# Patient Record
Sex: FEMALE | Race: WHITE | NOT HISPANIC OR LATINO | URBAN - METROPOLITAN AREA
[De-identification: names, ages, dates, MRNs, and addresses within clinical notes are randomized per-mention and may not be internally consistent; named-entity substitution may affect disease eponyms.]

---

## 2019-03-14 ENCOUNTER — EXTERNAL RECORD (OUTPATIENT)
Dept: OTHER | Age: 59
End: 2019-03-14

## 2020-01-19 ENCOUNTER — WALK IN (OUTPATIENT)
Dept: URGENT CARE | Age: 60
End: 2020-01-19

## 2020-01-19 VITALS
WEIGHT: 136 LBS | HEIGHT: 67 IN | RESPIRATION RATE: 16 BRPM | TEMPERATURE: 97.7 F | SYSTOLIC BLOOD PRESSURE: 100 MMHG | OXYGEN SATURATION: 99 % | BODY MASS INDEX: 21.35 KG/M2 | HEART RATE: 88 BPM | DIASTOLIC BLOOD PRESSURE: 68 MMHG

## 2020-01-19 DIAGNOSIS — J06.9 ACUTE URI: Primary | ICD-10-CM

## 2020-01-19 PROCEDURE — 99203 OFFICE O/P NEW LOW 30 MIN: CPT | Performed by: NURSE PRACTITIONER

## 2020-01-19 RX ORDER — CETIRIZINE HYDROCHLORIDE 10 MG/1
10 TABLET ORAL DAILY
COMMUNITY

## 2020-01-19 RX ORDER — ESTROGEN,CON/M-PROGEST ACET 0.3-1.5MG
1 TABLET ORAL DAILY
Refills: 3 | COMMUNITY
Start: 2020-01-02

## 2020-01-19 ASSESSMENT — ENCOUNTER SYMPTOMS
FATIGUE: 1
SHORTNESS OF BREATH: 0
WHEEZING: 0
RHINORRHEA: 1
DIAPHORESIS: 0
SORE THROAT: 0
CHILLS: 0
FEVER: 0
COUGH: 1

## 2020-11-09 ENCOUNTER — HOSPITAL ENCOUNTER (OUTPATIENT)
Age: 60
Discharge: HOME | End: 2020-11-09
Payer: COMMERCIAL

## 2020-11-09 VITALS
TEMPERATURE: 99 F | SYSTOLIC BLOOD PRESSURE: 140 MMHG | HEART RATE: 78 BPM | OXYGEN SATURATION: 98 % | RESPIRATION RATE: 14 BRPM | DIASTOLIC BLOOD PRESSURE: 69 MMHG

## 2020-11-09 DIAGNOSIS — Z20.822 ENCOUNTER FOR LABORATORY TESTING FOR COVID-19 VIRUS: ICD-10-CM

## 2020-11-09 DIAGNOSIS — J34.89 NASAL CONGESTION WITH RHINORRHEA: Primary | ICD-10-CM

## 2020-11-09 DIAGNOSIS — R09.81 NASAL CONGESTION WITH RHINORRHEA: Primary | ICD-10-CM

## 2020-11-09 PROBLEM — I48.3 TYPICAL ATRIAL FLUTTER (HCC): Status: ACTIVE | Noted: 2019-10-10

## 2020-11-09 PROBLEM — R92.2 BREAST DENSITY: Status: ACTIVE | Noted: 2017-07-24

## 2020-11-09 PROBLEM — J30.9 RHINITIS, ALLERGIC: Status: ACTIVE | Noted: 2018-10-30

## 2020-11-09 PROCEDURE — 99202 OFFICE O/P NEW SF 15 MIN: CPT | Performed by: PHYSICIAN ASSISTANT

## 2020-11-09 RX ORDER — DIPHENHYDRAMINE HCL 25 MG
25 CAPSULE ORAL EVERY 6 HOURS PRN
COMMUNITY

## 2020-11-09 NOTE — ED INITIAL ASSESSMENT (HPI)
Pt sts runny nose began on Saturday- relieved with Benadryl. Yesterday awoke with HA. No relief with Tyl. 99.5 temp this morning.

## 2020-11-09 NOTE — ED PROVIDER NOTES
Patient Seen in: Immediate [facility]      History   Patient presents with:  Sinus Problem: Entered by patient    Stated Complaint: Sinus Problem    HPI  Rudell Fearing is a 60-year-old female who presents today for Covid testing.   She has been experiencing rhino Pulmonary/Chest: Effort normal.  Speaking in complete sentences, without difficulty. Lungs are clear to auscultation. No wheezes, rales or rhonchi appreciated. Musculoskeletal: Normal range of motion. No edema or tenderness.    Neurological: CN III needed          Medications Prescribed:  Discharge Medication List as of 11/9/2020  1:32 PM

## (undated) NOTE — LETTER
Date & Time: 11/9/2020, 1:36 PM  Patient: Kori Kasper  Encounter Provider(s):    Katie Rubin PA-C       To Whom It May Concern: Roz Olszewski was seen and treated in our department on 11/9/2020.  She should not return to work until [facility]